# Patient Record
Sex: FEMALE | Race: WHITE
[De-identification: names, ages, dates, MRNs, and addresses within clinical notes are randomized per-mention and may not be internally consistent; named-entity substitution may affect disease eponyms.]

---

## 2019-07-18 ENCOUNTER — HOSPITAL ENCOUNTER (OUTPATIENT)
Dept: HOSPITAL 95 - ER | Age: 44
Setting detail: OBSERVATION
LOS: 3 days | Discharge: HOME | End: 2019-07-21
Attending: HOSPITALIST | Admitting: HOSPITALIST
Payer: COMMERCIAL

## 2019-07-18 VITALS — BODY MASS INDEX: 24.98 KG/M2 | HEIGHT: 67.01 IN | WEIGHT: 159.17 LBS

## 2019-07-18 DIAGNOSIS — S82.442A: ICD-10-CM

## 2019-07-18 DIAGNOSIS — E03.9: ICD-10-CM

## 2019-07-18 DIAGNOSIS — J45.909: ICD-10-CM

## 2019-07-18 DIAGNOSIS — Z88.5: ICD-10-CM

## 2019-07-18 DIAGNOSIS — Z88.8: ICD-10-CM

## 2019-07-18 DIAGNOSIS — S82.242A: Primary | ICD-10-CM

## 2019-07-18 DIAGNOSIS — Z79.899: ICD-10-CM
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
## 2019-07-18 DIAGNOSIS — B44.1: ICD-10-CM

## 2019-07-18 DIAGNOSIS — Z96.41: ICD-10-CM

## 2019-07-18 DIAGNOSIS — E10.9: ICD-10-CM

## 2019-07-18 DIAGNOSIS — M06.9: ICD-10-CM

## 2019-07-18 PROCEDURE — G0378 HOSPITAL OBSERVATION PER HR: HCPCS

## 2019-07-18 PROCEDURE — C1713 ANCHOR/SCREW BN/BN,TIS/BN: HCPCS

## 2019-07-18 PROCEDURE — C1769 GUIDE WIRE: HCPCS

## 2019-07-19 LAB
ALBUMIN SERPL BCP-MCNC: 3.8 G/DL (ref 3.4–5)
ALBUMIN/GLOB SERPL: 1.1 {RATIO} (ref 0.8–1.8)
ALT SERPL W P-5'-P-CCNC: 61 U/L (ref 12–78)
ANION GAP SERPL CALCULATED.4IONS-SCNC: 6 MMOL/L (ref 6–16)
AST SERPL W P-5'-P-CCNC: 120 U/L (ref 12–37)
BILIRUB SERPL-MCNC: 0.5 MG/DL (ref 0.1–1)
BUN SERPL-MCNC: 23 MG/DL (ref 8–24)
CALCIUM SERPL-MCNC: 8.6 MG/DL (ref 8.5–10.1)
CHLORIDE SERPL-SCNC: 110 MMOL/L (ref 98–108)
CO2 SERPL-SCNC: 28 MMOL/L (ref 21–32)
CREAT SERPL-MCNC: 0.89 MG/DL (ref 0.4–1)
DEPRECATED RDW RBC AUTO: 50.8 FL (ref 35.1–46.3)
ERYTHROCYTE [DISTWIDTH] IN BLOOD BY AUTOMATED COUNT: 14.6 % (ref 11.7–14.2)
GLOBULIN SER CALC-MCNC: 3.6 G/DL (ref 2.2–4)
GLUCOSE SERPL-MCNC: 146 MG/DL (ref 70–99)
HCT VFR BLD AUTO: 37.9 % (ref 33–51)
HGB BLD-MCNC: 12 G/DL (ref 11.5–16)
MCHC RBC AUTO-ENTMCNC: 31.7 G/DL (ref 31.5–36.5)
MCV RBC AUTO: 95 FL (ref 80–100)
NRBC # BLD AUTO: 0 K/MM3 (ref 0–0.02)
NRBC BLD AUTO-RTO: 0 /100 WBC (ref 0–0.2)
PLATELET # BLD AUTO: 241 K/MM3 (ref 150–400)
POTASSIUM SERPL-SCNC: 3.6 MMOL/L (ref 3.5–5.5)
PROT SERPL-MCNC: 7.4 G/DL (ref 6.4–8.2)
PROTHROMBIN TIME: 10.3 SEC (ref 9.7–11.5)
SODIUM SERPL-SCNC: 144 MMOL/L (ref 136–145)

## 2019-07-19 NOTE — NUR
SUMMARRY
PATIENT HAS DECLINED SLIDING SCALE INSULIN AS ORDERED. PATIENT REQUESTED AND
ORDERS OBTAINED THAT PATIENT MAY USE HER OWN CONTINUOUS GLUCOSE MONITORING
SYSTEM AND CAN SELF ADJUST AND ADMINISTER INSULIN PER HER HOME REGIME.

## 2019-07-19 NOTE — NUR
SHIFT SUMMARY
 
HAS RESTED COMFORTABLY SINCE ADMIT.  PAIN MANGED WITH IV PAIN MEDS.  HAS BEEN
NPO SINCE ADMIT FOR SX TODAY.  SAFETY MEASURES IN PLACE.  WILL GIVE HAND OFF
TO ONCOMING SHIFT USING SBAR.

## 2019-07-19 NOTE — NUR
RECEIVED HAND OFF FROM ER NURSE USING SBAR.  TRANSPORTED TO ROOM 223 VIA
STRETCHER.  TRANSFERED TO BED WITH STAFF ASSISTANCE, TOLERATED WELL.  AAO X3,
THORNTON, FOLLOWS ALL COMMANDS.  ORIENTED TO ROOM, CALL SYSTEM, AND POC, VOICES
UNDERSTANDING.  RESPIRATIONS EVEN AND UNLABOREDON ROOM AIR.  LUNG SOUNDS CLEAR
WITH EXPORATORY WHEEZING NOTED UNTIL PT COUGHED, THEN CLEAR BREATH SOUNDS
BILATERALLY.  ABDOMEN SOFT AND NONDISTENDED.  BOWEL SOUNDS PRESENT IN ALL
QUADS.  CONTINENT OF BOWEL AND BLADDER, ACTIVITY UP AD RAY.  DENIES NEED TO
URINATE AT THIS TIME.  LLE WRAPPED IN ACE OVER SPLINTING.  GOOD CAP REFILL,
DISTAL PULSES MARKED FOR EASE.  RIGHT FA 18G PIV IS PATENT, BUT DIFFICUT TO
FLUSH.  NEW PIV TO BE SITED.  GOING TO OR THIS AM PER ER STAFF, LOVENOX HELD.
NS STARTED AT 75ML/HR PER MD ORDERS.  RATES PAIN AT 3/10 AT THIS TIME.  DENIES
FURTHER NEEDS AT THIS TIME.  INSULIN PUMP WITH NOVOLOG INSULIN TO LLQ.  BASAL
RATE OF 2 UNITS PER HOUR, 0.1 UNIT INCRIMENTAL, 1:12 CARB RATIO Q BREAKFAST,
1:20 CARB RATIO Q LUNCH AND DINNER.  CONTINUOUS GLUCOSE MONITOR IN USE.  BGL
CURRENTLY 120.  DENIES FURTHER NEEDS AT THIS TIME.  ADMISSION ASSESSMENT IN
PROGRESS.  DENIES FURTHER NEEDS AT THIS TIME.  SAFETY MEASURES IN PLACE.  WILL
CONTINUE TO MONITOR.

## 2019-07-19 NOTE — NUR
SUMMARY
PAIN CONTROLLED WITH FENTANYL IV. PATIENT IS AWAITING SURGICAL REPAIR ON
7/20/19 AND IS AWARE OF NEED FOR NPO AFTER MIDNITE. PATIENT UP TO VOID ON
BEDSIDE COMMODE WITH STANDBY ASSIST

## 2019-07-20 LAB
ANION GAP SERPL CALCULATED.4IONS-SCNC: 3 MMOL/L (ref 6–16)
BASOPHILS # BLD AUTO: 0.12 K/MM3 (ref 0–0.23)
BASOPHILS NFR BLD AUTO: 2 % (ref 0–2)
BUN SERPL-MCNC: 18 MG/DL (ref 8–24)
CALCIUM SERPL-MCNC: 8.1 MG/DL (ref 8.5–10.1)
CHLORIDE SERPL-SCNC: 109 MMOL/L (ref 98–108)
CO2 SERPL-SCNC: 29 MMOL/L (ref 21–32)
CREAT SERPL-MCNC: 0.77 MG/DL (ref 0.4–1)
DEPRECATED RDW RBC AUTO: 50.6 FL (ref 35.1–46.3)
EOSINOPHIL # BLD AUTO: 1.15 K/MM3 (ref 0–0.68)
EOSINOPHIL NFR BLD AUTO: 15 % (ref 0–6)
ERYTHROCYTE [DISTWIDTH] IN BLOOD BY AUTOMATED COUNT: 14.6 % (ref 11.7–14.2)
GLUCOSE SERPL-MCNC: 141 MG/DL (ref 70–99)
HCT VFR BLD AUTO: 33.1 % (ref 33–51)
HGB BLD-MCNC: 10.3 G/DL (ref 11.5–16)
IMM GRANULOCYTES # BLD AUTO: 0.01 K/MM3 (ref 0–0.1)
IMM GRANULOCYTES NFR BLD AUTO: 0 % (ref 0–1)
LYMPHOCYTES # BLD AUTO: 2.15 K/MM3 (ref 0.84–5.2)
LYMPHOCYTES NFR BLD AUTO: 29 % (ref 21–46)
MCHC RBC AUTO-ENTMCNC: 31.1 G/DL (ref 31.5–36.5)
MCV RBC AUTO: 94 FL (ref 80–100)
MONOCYTES # BLD AUTO: 0.72 K/MM3 (ref 0.16–1.47)
MONOCYTES NFR BLD AUTO: 10 % (ref 4–13)
NEUTROPHILS # BLD AUTO: 3.34 K/MM3 (ref 1.96–9.15)
NEUTROPHILS NFR BLD AUTO: 45 % (ref 41–73)
NRBC # BLD AUTO: 0 K/MM3 (ref 0–0.02)
NRBC BLD AUTO-RTO: 0 /100 WBC (ref 0–0.2)
PLATELET # BLD AUTO: 185 K/MM3 (ref 150–400)
POTASSIUM SERPL-SCNC: 3.9 MMOL/L (ref 3.5–5.5)
SODIUM SERPL-SCNC: 141 MMOL/L (ref 136–145)

## 2019-07-20 PROCEDURE — 0QSH06Z REPOSITION LEFT TIBIA WITH INTRAMEDULLARY INTERNAL FIXATION DEVICE, OPEN APPROACH: ICD-10-PCS | Performed by: ORTHOPAEDIC SURGERY

## 2019-07-20 NOTE — NUR
PT TO HAVE PROCEDURE IN BED WITH OTHER STAFF. OTHER STAFF AWARE OF PT
REQUESTING BREATHING TX BEFORE HAVING PROCEDURE. FAMILY AND FRIEND PRESENT.

## 2019-07-20 NOTE — NUR
SHIFT SUMMARY
PT HAD PROCEDURE TODAY. PT BEEN RESTING QUIETLY AFTER HAVING THERAPY THIS
AFTERNOON. PT BEEN ASSISTED WITH ADL'S PRN. PT BEEN MED FOR PAIN PRN. PT
MANAGING OWN INSULIN WITH INSULIN PUMP. FAMILY/FRIENDS BEEN IN ROOM. PT USING
CALL LIGHT APPR.

## 2019-07-20 NOTE — NUR
PT BACK FROM HAVING PROCEDURE. PT ABLE TO WIGGLE TOES. SPLINT/ACE WRAP IN
PLACE TO LLE. LLE ELEVATED ON MULT BLANKETS. PT REPORTS SORE THROAT IS ONLY
COMPLAINT AT THIS TIME. FAMILY/FRIENDS IN ROOM. PT PLEASANT AND COOPERATIVE.

## 2019-07-20 NOTE — NUR
07/20/19 0950 Simon Kumar
PT TO ROOM 4 VIA BED, NECKLACE AND EARRING GIVEN TO FAMILY. RING
ON-CONSENT SIGNED

## 2019-07-20 NOTE — NUR
SHIFT SUMMARY:
 
PT A&O X4. VS WNL T/O SHIFT. PAIN MANAGED WITH 50 MCG OF FENTANYL. MEDICATED
TWICE THIS SHIFT. SPLINT TO LLE IN PLACE. CAP REFILL WNL. PT IS ABLE TO WIGGLE
TOES. DENIES N/T. OUT OF BED TO BEDSIDE COMMODE WITH SBA. VOIDING. PT HAS BEEN
NPO SINCE MIDNIGHT WITH FLUIDS INFUSING. PLAN FOR SURGERY TODAY.

## 2019-07-21 LAB
ANION GAP SERPL CALCULATED.4IONS-SCNC: 3 MMOL/L (ref 6–16)
BASOPHILS # BLD AUTO: 0.07 K/MM3 (ref 0–0.23)
BASOPHILS NFR BLD AUTO: 1 % (ref 0–2)
BUN SERPL-MCNC: 15 MG/DL (ref 8–24)
CALCIUM SERPL-MCNC: 8.5 MG/DL (ref 8.5–10.1)
CHLORIDE SERPL-SCNC: 107 MMOL/L (ref 98–108)
CO2 SERPL-SCNC: 31 MMOL/L (ref 21–32)
CREAT SERPL-MCNC: 0.9 MG/DL (ref 0.4–1)
DEPRECATED RDW RBC AUTO: 52 FL (ref 35.1–46.3)
EOSINOPHIL # BLD AUTO: 1.04 K/MM3 (ref 0–0.68)
EOSINOPHIL NFR BLD AUTO: 14 % (ref 0–6)
ERYTHROCYTE [DISTWIDTH] IN BLOOD BY AUTOMATED COUNT: 14.6 % (ref 11.7–14.2)
GLUCOSE SERPL-MCNC: 199 MG/DL (ref 70–99)
HCT VFR BLD AUTO: 31 % (ref 33–51)
HGB BLD-MCNC: 9.7 G/DL (ref 11.5–16)
IMM GRANULOCYTES # BLD AUTO: 0.01 K/MM3 (ref 0–0.1)
IMM GRANULOCYTES NFR BLD AUTO: 0 % (ref 0–1)
LYMPHOCYTES # BLD AUTO: 1.81 K/MM3 (ref 0.84–5.2)
LYMPHOCYTES NFR BLD AUTO: 24 % (ref 21–46)
MCHC RBC AUTO-ENTMCNC: 31.3 G/DL (ref 31.5–36.5)
MCV RBC AUTO: 97 FL (ref 80–100)
MONOCYTES # BLD AUTO: 0.6 K/MM3 (ref 0.16–1.47)
MONOCYTES NFR BLD AUTO: 8 % (ref 4–13)
NEUTROPHILS # BLD AUTO: 4.03 K/MM3 (ref 1.96–9.15)
NEUTROPHILS NFR BLD AUTO: 53 % (ref 41–73)
NRBC # BLD AUTO: 0 K/MM3 (ref 0–0.02)
NRBC BLD AUTO-RTO: 0 /100 WBC (ref 0–0.2)
PLATELET # BLD AUTO: 168 K/MM3 (ref 150–400)
POTASSIUM SERPL-SCNC: 4 MMOL/L (ref 3.5–5.5)
SODIUM SERPL-SCNC: 141 MMOL/L (ref 136–145)

## 2019-07-21 NOTE — NUR
SHIFT SUMMARY:
 
PT POD #2 FOR RODDING OF LLE. VS WNL T/O SHIFT. PAIN MANAGED WITH OXY Q4 PER
EMAR. GIVEN 50 MCG OF FENTANYL ONCE. OOB TO BSC WITH ONE ASSIST. VOIDING WELL.
EXTREMITY ELEVATED ON PILLOWS WITH ICE. DRESSING CDI. JAMES REG DIET. DENIES
N/V. FLUIDS INFUSING. INSULIN ADMINISTRATION BEING MANAGED BY PT VIA INSULIN
PUMP.

## 2019-07-21 NOTE — NUR
DISCUSSED MEDS AND DIET WITH DR LALTRATE. REPORTS DOES NOT NEED TO BE ON FLUID
RESTRICTION. SEE ORDERS.

## 2019-07-21 NOTE — NUR
PT RECENTLY DISCHARGED. PT REPORTED UNDERSTANDING OF DISCHARGE INSTRUCTIONS.
PT GIVEN SCRIPT. MERLY CALLED IN TO [facility] ON [address]
PER PT REQ. PT EATING AND DRINKING. PAIN TOLERABLE ON PO PAIN MEDICATION. PT
BEEN CLEARED BY THERAPY TO GO HOME. PT REPORTS HAVING WALKER AT HOME.